# Patient Record
Sex: FEMALE | Race: OTHER | Employment: UNEMPLOYED | URBAN - METROPOLITAN AREA
[De-identification: names, ages, dates, MRNs, and addresses within clinical notes are randomized per-mention and may not be internally consistent; named-entity substitution may affect disease eponyms.]

---

## 2023-12-11 ENCOUNTER — OFFICE VISIT (OUTPATIENT)
Dept: FAMILY MEDICINE CLINIC | Facility: CLINIC | Age: 83
End: 2023-12-11
Payer: COMMERCIAL

## 2023-12-11 VITALS
HEIGHT: 61 IN | SYSTOLIC BLOOD PRESSURE: 120 MMHG | DIASTOLIC BLOOD PRESSURE: 60 MMHG | HEART RATE: 70 BPM | TEMPERATURE: 97 F | WEIGHT: 142 LBS | OXYGEN SATURATION: 98 % | BODY MASS INDEX: 26.81 KG/M2 | RESPIRATION RATE: 16 BRPM

## 2023-12-11 DIAGNOSIS — Z95.820 STATUS POST ANGIOPLASTY WITH STENT: Primary | ICD-10-CM

## 2023-12-11 DIAGNOSIS — I48.91 NEW ONSET ATRIAL FIBRILLATION (HCC): ICD-10-CM

## 2023-12-11 DIAGNOSIS — E78.2 MIXED HYPERLIPIDEMIA: ICD-10-CM

## 2023-12-11 DIAGNOSIS — I21.4 NON-STEMI (NON-ST ELEVATED MYOCARDIAL INFARCTION) (HCC): ICD-10-CM

## 2023-12-11 DIAGNOSIS — I10 ESSENTIAL HYPERTENSION: ICD-10-CM

## 2023-12-11 DIAGNOSIS — I25.2 HISTORY OF MI (MYOCARDIAL INFARCTION): ICD-10-CM

## 2023-12-11 DIAGNOSIS — E04.1 THYROID NODULE: ICD-10-CM

## 2023-12-11 DIAGNOSIS — Z86.39 HISTORY OF NON-INSULIN DEPENDENT TYPE 2 DIABETES MELLITUS: ICD-10-CM

## 2023-12-11 DIAGNOSIS — M15.9 OSTEOARTHRITIS INVOLVING MULTIPLE JOINTS ON BOTH SIDES OF BODY: ICD-10-CM

## 2023-12-11 PROBLEM — E03.8 HYPOTHYROIDISM DUE TO HASHIMOTO'S THYROIDITIS: Status: ACTIVE | Noted: 2017-06-17

## 2023-12-11 PROBLEM — E78.5 HYPERLIPIDEMIA: Status: ACTIVE | Noted: 2017-06-17

## 2023-12-11 PROBLEM — E06.3 HYPOTHYROIDISM DUE TO HASHIMOTO'S THYROIDITIS: Status: ACTIVE | Noted: 2017-06-17

## 2023-12-11 PROCEDURE — 99203 OFFICE O/P NEW LOW 30 MIN: CPT | Performed by: FAMILY MEDICINE

## 2023-12-11 PROCEDURE — 82570 ASSAY OF URINE CREATININE: CPT | Performed by: FAMILY MEDICINE

## 2023-12-11 PROCEDURE — 83036 HEMOGLOBIN GLYCOSYLATED A1C: CPT | Performed by: FAMILY MEDICINE

## 2023-12-11 PROCEDURE — 82043 UR ALBUMIN QUANTITATIVE: CPT | Performed by: FAMILY MEDICINE

## 2023-12-11 RX ORDER — BLOOD-GLUCOSE SENSOR
EACH MISCELLANEOUS
COMMUNITY
Start: 2023-11-08

## 2023-12-11 RX ORDER — HYDROCODONE BITARTRATE AND ACETAMINOPHEN 5; 325 MG/1; MG/1
1 TABLET ORAL EVERY 6 HOURS PRN
Qty: 15 TABLET | Refills: 0 | Status: SHIPPED | OUTPATIENT
Start: 2023-12-11 | End: 2023-12-26

## 2023-12-11 RX ORDER — CLOPIDOGREL BISULFATE 75 MG/1
75 TABLET ORAL DAILY
COMMUNITY
Start: 2022-07-20

## 2023-12-11 RX ORDER — SOTALOL HYDROCHLORIDE 80 MG/1
80 TABLET ORAL DAILY
COMMUNITY
Start: 2021-09-21

## 2023-12-11 RX ORDER — ISOSORBIDE MONONITRATE 30 MG/1
30 TABLET, EXTENDED RELEASE ORAL EVERY 24 HOURS
COMMUNITY

## 2023-12-11 RX ORDER — METFORMIN HYDROCHLORIDE 500 MG/1
1000 TABLET, EXTENDED RELEASE ORAL 2 TIMES DAILY
COMMUNITY
Start: 2023-07-07

## 2023-12-11 RX ORDER — ROSUVASTATIN CALCIUM 40 MG/1
40 TABLET, COATED ORAL DAILY
COMMUNITY
Start: 2023-08-30

## 2023-12-11 RX ORDER — DAPAGLIFLOZIN 5 MG/1
5 TABLET, FILM COATED ORAL DAILY
COMMUNITY

## 2023-12-11 RX ORDER — IRBESARTAN 75 MG/1
75 TABLET ORAL DAILY
COMMUNITY
Start: 2021-10-12

## 2023-12-12 LAB
CREAT UR-SCNC: 90.6 MG/DL
EST. AVERAGE GLUCOSE BLD GHB EST-MCNC: 209 MG/DL (ref 68–126)
HBA1C MFR BLD: 8.9 % (ref ?–5.7)
MICROALBUMIN UR-MCNC: 19.7 MG/DL
MICROALBUMIN/CREAT 24H UR-RTO: 217.4 UG/MG (ref ?–30)

## 2023-12-13 ENCOUNTER — TELEPHONE (OUTPATIENT)
Dept: FAMILY MEDICINE CLINIC | Facility: CLINIC | Age: 83
End: 2023-12-13

## 2023-12-13 NOTE — TELEPHONE ENCOUNTER
----- Message from Bib Whitmore, DO sent at 12/12/2023  5:00 PM CST -----  So can we go up to 25 and see how she does with it, maybe we can gradually adjust her and leave everything else the same.

## 2023-12-28 RX ORDER — DAPAGLIFLOZIN 5 MG/1
5 TABLET, FILM COATED ORAL DAILY
Qty: 90 TABLET | Refills: 2 | Status: SHIPPED | OUTPATIENT
Start: 2023-12-28 | End: 2024-03-27

## 2023-12-28 RX ORDER — CLOPIDOGREL BISULFATE 75 MG/1
75 TABLET ORAL DAILY
Qty: 90 TABLET | Refills: 0 | OUTPATIENT
Start: 2023-12-28

## 2023-12-28 RX ORDER — ISOSORBIDE MONONITRATE 30 MG/1
30 TABLET, EXTENDED RELEASE ORAL EVERY 24 HOURS
Qty: 90 TABLET | Refills: 2 | Status: SHIPPED | OUTPATIENT
Start: 2023-12-28 | End: 2024-03-27

## 2023-12-28 RX ORDER — ROSUVASTATIN CALCIUM 40 MG/1
40 TABLET, COATED ORAL DAILY
Qty: 90 TABLET | Refills: 2 | Status: SHIPPED | OUTPATIENT
Start: 2023-12-28 | End: 2024-03-27

## 2023-12-28 RX ORDER — METFORMIN HYDROCHLORIDE 500 MG/1
1000 TABLET, EXTENDED RELEASE ORAL 2 TIMES DAILY
Qty: 180 TABLET | Refills: 2 | Status: SHIPPED | OUTPATIENT
Start: 2023-12-28 | End: 2024-03-27

## 2023-12-28 RX ORDER — SOTALOL HYDROCHLORIDE 80 MG/1
80 TABLET ORAL DAILY
Qty: 90 TABLET | Refills: 2 | OUTPATIENT
Start: 2023-12-28 | End: 2024-09-23

## 2023-12-28 RX ORDER — IRBESARTAN 75 MG/1
75 TABLET ORAL DAILY
Qty: 90 TABLET | Refills: 2 | OUTPATIENT
Start: 2023-12-28 | End: 2024-09-23

## 2023-12-28 RX ORDER — SOTALOL HYDROCHLORIDE 80 MG/1
80 TABLET ORAL DAILY
Qty: 90 TABLET | Refills: 2 | Status: SHIPPED | OUTPATIENT
Start: 2023-12-28 | End: 2024-03-27

## 2023-12-28 RX ORDER — ROSUVASTATIN CALCIUM 40 MG/1
40 TABLET, COATED ORAL DAILY
Qty: 90 TABLET | Refills: 2 | OUTPATIENT
Start: 2023-12-28 | End: 2024-09-23

## 2023-12-28 RX ORDER — DAPAGLIFLOZIN 5 MG/1
5 TABLET, FILM COATED ORAL DAILY
Qty: 90 TABLET | Refills: 2 | OUTPATIENT
Start: 2023-12-28 | End: 2024-09-23

## 2023-12-28 RX ORDER — IRBESARTAN 75 MG/1
75 TABLET ORAL DAILY
Qty: 90 TABLET | Refills: 2 | Status: SHIPPED | OUTPATIENT
Start: 2023-12-28 | End: 2024-03-27

## 2023-12-28 NOTE — TELEPHONE ENCOUNTER
DAUGHTER STOPPED BY AND ADV THAT PT NEEDS SOME REFILLS OF    rosuvastatin 40 MG Oral Tab     clopidogrel 75 MG Oral Tab     irbesartan 75 MG Oral Tab     sotalol 80 MG Oral Tab     FARXIGA 5 MG Oral Tab     ** LOOKING FOR 90 DAY REFILL **    JAYE AVILEZ       THANK YOU

## 2024-01-02 RX ORDER — CLOPIDOGREL BISULFATE 75 MG/1
75 TABLET ORAL DAILY
Refills: 0 | OUTPATIENT
Start: 2024-01-02

## 2024-01-02 RX ORDER — ROSUVASTATIN CALCIUM 40 MG/1
40 TABLET, COATED ORAL DAILY
Qty: 90 TABLET | Refills: 2 | OUTPATIENT
Start: 2024-01-02 | End: 2024-04-01

## 2024-01-02 RX ORDER — IRBESARTAN 75 MG/1
75 TABLET ORAL DAILY
Qty: 90 TABLET | Refills: 2 | OUTPATIENT
Start: 2024-01-02 | End: 2024-04-01

## 2024-01-02 RX ORDER — DAPAGLIFLOZIN 5 MG/1
5 TABLET, FILM COATED ORAL DAILY
Qty: 90 TABLET | Refills: 2 | OUTPATIENT
Start: 2024-01-02 | End: 2024-04-01

## 2024-01-02 RX ORDER — SOTALOL HYDROCHLORIDE 80 MG/1
80 TABLET ORAL DAILY
Qty: 90 TABLET | Refills: 2 | OUTPATIENT
Start: 2024-01-02 | End: 2024-04-01

## 2024-02-28 DIAGNOSIS — Z95.820 STATUS POST ANGIOPLASTY WITH STENT: ICD-10-CM

## 2024-02-28 DIAGNOSIS — I25.2 HISTORY OF MI (MYOCARDIAL INFARCTION): Primary | ICD-10-CM

## 2024-02-28 DIAGNOSIS — I21.4 NON-STEMI (NON-ST ELEVATED MYOCARDIAL INFARCTION) (HCC): ICD-10-CM

## 2024-03-13 ENCOUNTER — MED REC SCAN ONLY (OUTPATIENT)
Dept: FAMILY MEDICINE CLINIC | Facility: CLINIC | Age: 84
End: 2024-03-13

## 2024-03-14 ENCOUNTER — TELEPHONE (OUTPATIENT)
Dept: FAMILY MEDICINE CLINIC | Facility: CLINIC | Age: 84
End: 2024-03-14

## 2024-03-14 DIAGNOSIS — E11.9 DIABETES MELLITUS TYPE 2, NONINSULIN DEPENDENT (HCC): Primary | ICD-10-CM

## 2024-03-14 DIAGNOSIS — D50.8 OTHER IRON DEFICIENCY ANEMIA: ICD-10-CM

## 2024-03-14 DIAGNOSIS — E78.2 MIXED HYPERLIPIDEMIA: ICD-10-CM

## 2024-03-14 DIAGNOSIS — E04.1 THYROID NODULE: ICD-10-CM

## 2024-03-14 DIAGNOSIS — Z86.39 HISTORY OF NON-INSULIN DEPENDENT TYPE 2 DIABETES MELLITUS: ICD-10-CM

## 2024-03-14 NOTE — TELEPHONE ENCOUNTER
Future Appointments   Date Time Provider Department Center   3/16/2024 11:00 AM [facility] NURSE LASHONDA Birch       Need lab orders please. Thank you

## 2024-03-18 ENCOUNTER — NURSE ONLY (OUTPATIENT)
Dept: FAMILY MEDICINE CLINIC | Facility: CLINIC | Age: 84
End: 2024-03-18
Payer: COMMERCIAL

## 2024-03-18 DIAGNOSIS — D50.8 OTHER IRON DEFICIENCY ANEMIA: ICD-10-CM

## 2024-03-18 DIAGNOSIS — E78.2 MIXED HYPERLIPIDEMIA: ICD-10-CM

## 2024-03-18 DIAGNOSIS — E11.9 DIABETES MELLITUS TYPE 2, NONINSULIN DEPENDENT (HCC): ICD-10-CM

## 2024-03-18 DIAGNOSIS — E04.1 THYROID NODULE: ICD-10-CM

## 2024-03-18 LAB
ALBUMIN SERPL-MCNC: 4 G/DL (ref 3.4–5)
ALBUMIN/GLOB SERPL: 1.3 {RATIO} (ref 1–2)
ALP LIVER SERPL-CCNC: 99 U/L
ALT SERPL-CCNC: 133 U/L
ANION GAP SERPL CALC-SCNC: 4 MMOL/L (ref 0–18)
AST SERPL-CCNC: 107 U/L (ref 15–37)
BASOPHILS # BLD AUTO: 0 X10(3) UL (ref 0–0.2)
BASOPHILS NFR BLD AUTO: 0 %
BILIRUB SERPL-MCNC: 0.6 MG/DL (ref 0.1–2)
BUN BLD-MCNC: 27 MG/DL (ref 9–23)
CALCIUM BLD-MCNC: 10 MG/DL (ref 8.5–10.1)
CHLORIDE SERPL-SCNC: 115 MMOL/L (ref 98–112)
CHOLEST SERPL-MCNC: 99 MG/DL (ref ?–200)
CO2 SERPL-SCNC: 25 MMOL/L (ref 21–32)
CREAT BLD-MCNC: 1.81 MG/DL
EGFRCR SERPLBLD CKD-EPI 2021: 27 ML/MIN/1.73M2 (ref 60–?)
EOSINOPHIL # BLD AUTO: 0.08 X10(3) UL (ref 0–0.7)
EOSINOPHIL NFR BLD AUTO: 1.5 %
ERYTHROCYTE [DISTWIDTH] IN BLOOD BY AUTOMATED COUNT: 12.3 %
EST. AVERAGE GLUCOSE BLD GHB EST-MCNC: 275 MG/DL (ref 68–126)
GLOBULIN PLAS-MCNC: 3.1 G/DL (ref 2.8–4.4)
GLUCOSE BLD-MCNC: 134 MG/DL (ref 70–99)
HBA1C MFR BLD: 11.2 % (ref ?–5.7)
HCT VFR BLD AUTO: 34.4 %
HDLC SERPL-MCNC: 53 MG/DL (ref 40–59)
HGB BLD-MCNC: 11.5 G/DL
IMM GRANULOCYTES # BLD AUTO: 0.01 X10(3) UL (ref 0–1)
IMM GRANULOCYTES NFR BLD: 0.2 %
LDLC SERPL CALC-MCNC: 32 MG/DL (ref ?–100)
LYMPHOCYTES # BLD AUTO: 2.71 X10(3) UL (ref 1–4)
LYMPHOCYTES NFR BLD AUTO: 50.7 %
MCH RBC QN AUTO: 29.6 PG (ref 26–34)
MCHC RBC AUTO-ENTMCNC: 33.4 G/DL (ref 31–37)
MCV RBC AUTO: 88.7 FL
MONOCYTES # BLD AUTO: 0.56 X10(3) UL (ref 0.1–1)
MONOCYTES NFR BLD AUTO: 10.5 %
NEUTROPHILS # BLD AUTO: 1.99 X10 (3) UL (ref 1.5–7.7)
NEUTROPHILS # BLD AUTO: 1.99 X10(3) UL (ref 1.5–7.7)
NEUTROPHILS NFR BLD AUTO: 37.1 %
NONHDLC SERPL-MCNC: 46 MG/DL (ref ?–130)
OSMOLALITY SERPL CALC.SUM OF ELEC: 305 MOSM/KG (ref 275–295)
PLATELET # BLD AUTO: 179 10(3)UL (ref 150–450)
POTASSIUM SERPL-SCNC: 5.1 MMOL/L (ref 3.5–5.1)
PROT SERPL-MCNC: 7.1 G/DL (ref 6.4–8.2)
RBC # BLD AUTO: 3.88 X10(6)UL
SODIUM SERPL-SCNC: 144 MMOL/L (ref 136–145)
T4 FREE SERPL-MCNC: 1.1 NG/DL (ref 0.8–1.7)
THYROGLOB SERPL-MCNC: 57 U/ML (ref ?–60)
THYROPEROXIDASE AB SERPL-ACNC: 780 U/ML (ref ?–60)
TRIGL SERPL-MCNC: 60 MG/DL (ref 30–149)
TSI SER-ACNC: 1.63 MIU/ML (ref 0.36–3.74)
VLDLC SERPL CALC-MCNC: 8 MG/DL (ref 0–30)
WBC # BLD AUTO: 5.4 X10(3) UL (ref 4–11)

## 2024-03-18 PROCEDURE — 83036 HEMOGLOBIN GLYCOSYLATED A1C: CPT | Performed by: FAMILY MEDICINE

## 2024-03-18 PROCEDURE — 80050 GENERAL HEALTH PANEL: CPT | Performed by: FAMILY MEDICINE

## 2024-03-18 PROCEDURE — 80061 LIPID PANEL: CPT | Performed by: FAMILY MEDICINE

## 2024-03-18 PROCEDURE — 84439 ASSAY OF FREE THYROXINE: CPT | Performed by: FAMILY MEDICINE

## 2024-03-18 NOTE — PROGRESS NOTES
Here for NV blood draw    1 lavender, 1 mint, 1 gold tube drawn from left AC with butterfly needle    Patient tolerated well and left office in stable condition

## 2024-03-19 ENCOUNTER — TELEPHONE (OUTPATIENT)
Dept: FAMILY MEDICINE CLINIC | Facility: CLINIC | Age: 84
End: 2024-03-19

## 2024-03-19 DIAGNOSIS — R79.89 ELEVATED LFTS: Primary | ICD-10-CM

## 2024-03-19 RX ORDER — BLOOD-GLUCOSE SENSOR
EACH MISCELLANEOUS
Qty: 6 EACH | Refills: 2 | Status: SHIPPED | OUTPATIENT
Start: 2024-03-19

## 2024-03-19 NOTE — TELEPHONE ENCOUNTER
RN faxed results to Cardiologist- requested copy of last ECHO to be sent to PCP.    Updated Dr. Eriberto Roldan under Care Team

## 2024-03-19 NOTE — TELEPHONE ENCOUNTER
----- Message from Bib Whitmore DO sent at 3/19/2024 10:19 AM CDT -----  Notified daughter of results, needs tighter glucose control also better hydration, renal and Liver function may be a perfusion issue. Not sure when last ECHO was and what the EF was. Please forward to Dr. Eriberto Roldan, cardiologist. Lets stop the metformin, cut the rosuvastatin in half increase her insulin to 25 units and adjust upward , recheck CMP in 2 weeks, also discussed hydration

## 2024-04-17 ENCOUNTER — TELEPHONE (OUTPATIENT)
Dept: FAMILY MEDICINE CLINIC | Facility: CLINIC | Age: 84
End: 2024-04-17

## 2024-04-17 DIAGNOSIS — R26.89 LOSS OF BALANCE: ICD-10-CM

## 2024-04-17 DIAGNOSIS — R26.9 ABNORMAL GAIT: ICD-10-CM

## 2024-04-17 DIAGNOSIS — I21.4 NON-STEMI (NON-ST ELEVATED MYOCARDIAL INFARCTION) (HCC): Primary | ICD-10-CM

## 2024-04-17 DIAGNOSIS — R29.898 WEAKNESS OF LEFT LOWER EXTREMITY: ICD-10-CM

## 2024-04-18 RX ORDER — IRBESARTAN 75 MG/1
75 TABLET ORAL DAILY
COMMUNITY
Start: 2024-04-05

## 2024-04-18 RX ORDER — SOTALOL HYDROCHLORIDE 80 MG/1
80 TABLET ORAL DAILY
COMMUNITY
Start: 2024-04-05

## 2024-04-18 RX ORDER — ROSUVASTATIN CALCIUM 40 MG/1
40 TABLET, COATED ORAL DAILY
COMMUNITY
Start: 2024-04-05

## 2024-04-18 RX ORDER — DAPAGLIFLOZIN 5 MG/1
5 TABLET, FILM COATED ORAL DAILY
COMMUNITY
Start: 2024-04-15

## 2024-04-18 NOTE — TELEPHONE ENCOUNTER
Per discussion with Dr Whitmore, fax order and letter to [facility] in [place]    Order and letter faxed

## 2024-04-18 NOTE — TELEPHONE ENCOUNTER
Received notification [facility] [place] or be faxed to [phone]  There is a form to be completed and faxed with insurance, demographics and OV note  Form has been requested, waiting on fax    Can call [facility] at [phone] with any questions

## 2024-04-19 ENCOUNTER — LAB ENCOUNTER (OUTPATIENT)
Dept: LAB | Age: 84
End: 2024-04-19
Attending: FAMILY MEDICINE
Payer: COMMERCIAL

## 2024-04-19 DIAGNOSIS — R79.89 ELEVATED LFTS: ICD-10-CM

## 2024-04-19 LAB
ALBUMIN SERPL-MCNC: 3.5 G/DL (ref 3.4–5)
ALBUMIN/GLOB SERPL: 0.9 {RATIO} (ref 1–2)
ALP LIVER SERPL-CCNC: 82 U/L
ALT SERPL-CCNC: 61 U/L
ANION GAP SERPL CALC-SCNC: 6 MMOL/L (ref 0–18)
AST SERPL-CCNC: 55 U/L (ref 15–37)
BILIRUB SERPL-MCNC: 0.8 MG/DL (ref 0.1–2)
BUN BLD-MCNC: 25 MG/DL (ref 9–23)
CALCIUM BLD-MCNC: 9.4 MG/DL (ref 8.5–10.1)
CHLORIDE SERPL-SCNC: 108 MMOL/L (ref 98–112)
CO2 SERPL-SCNC: 25 MMOL/L (ref 21–32)
CREAT BLD-MCNC: 1.25 MG/DL
EGFRCR SERPLBLD CKD-EPI 2021: 42 ML/MIN/1.73M2 (ref 60–?)
FASTING STATUS PATIENT QL REPORTED: NO
GLOBULIN PLAS-MCNC: 3.9 G/DL (ref 2.8–4.4)
GLUCOSE BLD-MCNC: 234 MG/DL (ref 70–99)
OSMOLALITY SERPL CALC.SUM OF ELEC: 300 MOSM/KG (ref 275–295)
POTASSIUM SERPL-SCNC: 5.1 MMOL/L (ref 3.5–5.1)
PROT SERPL-MCNC: 7.4 G/DL (ref 6.4–8.2)
SODIUM SERPL-SCNC: 139 MMOL/L (ref 136–145)

## 2024-04-19 PROCEDURE — 80053 COMPREHEN METABOLIC PANEL: CPT | Performed by: FAMILY MEDICINE

## 2024-04-20 ENCOUNTER — TELEPHONE (OUTPATIENT)
Dept: FAMILY MEDICINE CLINIC | Facility: CLINIC | Age: 84
End: 2024-04-20

## 2024-04-20 DIAGNOSIS — N18.32 CKD STAGE 3B, GFR 30-44 ML/MIN (HCC): ICD-10-CM

## 2024-04-20 DIAGNOSIS — E11.9 DIABETES MELLITUS TYPE 2, NONINSULIN DEPENDENT (HCC): Primary | ICD-10-CM

## 2024-04-20 NOTE — PROGRESS NOTES
Notified daughter  of latest lab results, which showed considerable improvement in her renal function after discontinuation of Metformin, and reduction of the rosuvastatin.  Lets keep everything the same as it is currently work on glucose control a bit more and she should be fine we can recheck it in 3-4 months

## 2024-04-20 NOTE — TELEPHONE ENCOUNTER
Pt's dtr requesting lab results from 03/18/24 and 04/19/24    Printed and given to pt's dtr    Dr. Whitmore discussed with pt's dtr regarding lab results 04/19/24    Recall placed - recheck labs in 3-4 months

## 2024-05-30 ENCOUNTER — TELEPHONE (OUTPATIENT)
Dept: FAMILY MEDICINE CLINIC | Facility: CLINIC | Age: 84
End: 2024-05-30

## 2024-05-30 DIAGNOSIS — I21.4 NSTEMI (NON-ST ELEVATION MYOCARDIAL INFARCTION) (HCC): Primary | ICD-10-CM

## 2024-05-30 DIAGNOSIS — R26.9 ABNORMAL GAIT: ICD-10-CM

## 2024-05-30 DIAGNOSIS — R26.89 LOSS OF BALANCE: ICD-10-CM

## 2024-05-30 NOTE — TELEPHONE ENCOUNTER
Daughter is asking for new referral to be placed for wheelchair    Daughter provided information on [employer]- but they do not provide DME products     Order placed for [employer]- as they do wheelchairs- will fax order with insurance card

## 2024-06-18 ENCOUNTER — MED REC SCAN ONLY (OUTPATIENT)
Dept: FAMILY MEDICINE CLINIC | Facility: CLINIC | Age: 84
End: 2024-06-18

## 2024-07-19 ENCOUNTER — TELEPHONE (OUTPATIENT)
Dept: FAMILY MEDICINE CLINIC | Facility: CLINIC | Age: 84
End: 2024-07-19

## 2024-07-19 NOTE — TELEPHONE ENCOUNTER
CALL FROM [employer].  BRAD FROM [employer] SAYS THEY NEVER RECEIVED THE SIGNED ORDER FOR WHEELCHAIR.  BRAD WILL BE NEEDING OFFICE NOTES/H&P, REASON FOR THIS WHEELCHAIR AND THE RX.  BRAD VERIFIED OUR FAX NUMBER AND WILL BE REFAXING ORDER.  HER DIRECT NUMBER [phone]  FAX#[phone]

## 2024-07-22 RX ORDER — BLOOD-GLUCOSE SENSOR
EACH MISCELLANEOUS
Qty: 12 EACH | Refills: 1 | Status: SHIPPED | OUTPATIENT
Start: 2024-07-22

## 2024-07-24 ENCOUNTER — TELEPHONE (OUTPATIENT)
Dept: FAMILY MEDICINE CLINIC | Facility: CLINIC | Age: 84
End: 2024-07-24

## 2024-07-24 PROBLEM — Z87.39 HISTORY OF NECROTIZING FASCIITIS: Status: ACTIVE | Noted: 2024-07-24

## 2024-07-24 NOTE — TELEPHONE ENCOUNTER
Letter mailed to patient reminding her she is due for labs per patient reminder.    Lab Frequency Next Occurrence   Comp Metabolic Panel (14) [E] Once 08/20/2024   Hemoglobin A1C [E] Once 08/20/2024     Miya Calderón RN  P [facility] Clinical Staff  recheck labs in 3-4 months

## 2024-07-25 ENCOUNTER — TELEPHONE (OUTPATIENT)
Dept: FAMILY MEDICINE CLINIC | Facility: CLINIC | Age: 84
End: 2024-07-25

## 2024-08-01 ENCOUNTER — MED REC SCAN ONLY (OUTPATIENT)
Dept: FAMILY MEDICINE CLINIC | Facility: CLINIC | Age: 84
End: 2024-08-01

## 2024-12-06 NOTE — TELEPHONE ENCOUNTER
Last OV:12/11/2023  Last refill:07/22/2024 12 each, 1 refill   Last labs 12/11/2023 A1c     Medication pended, please sign if appropriate

## 2024-12-06 NOTE — TELEPHONE ENCOUNTER
Patient's daughter calling to request refill of Continuous Glucose Sensor (FREESTYLE IND[place] 3 SENSOR) Does not apply Misc   Requesting 3 month supply  Pharmacy [id number] - [place], [place] - [address] [phone], [phone] [[id number]]

## 2024-12-07 RX ORDER — ACYCLOVIR 800 MG/1
TABLET ORAL
Qty: 6 EACH | Refills: 1 | Status: SHIPPED | OUTPATIENT
Start: 2024-12-07

## 2025-02-20 RX ORDER — ACYCLOVIR 800 MG/1
TABLET ORAL
Qty: 6 EACH | Refills: 1 | Status: SHIPPED | OUTPATIENT
Start: 2025-02-20

## 2025-02-20 NOTE — TELEPHONE ENCOUNTER
Pt failed refill protocol for the following reasons:  Requested Renewals     Continuous Glucose Sensor (FREESTYLE IND[place] 3 SENSOR) Does not apply Misc         Sig: INSERT ONE DEVICE INTO THE SKIN EVERY 14 DAYS    Disp: 6 each    Refills: 1    Start: 2/20/2025    Class: Normal    Non-formulary    Last ordered: 2 months ago (12/7/2024) by Yariel Ngo, DO    Diabetic Supplies Protocol Cuwszo3702/20/2025 07:50 AM   Protocol Details In person appointment or virtual visit in the past 12 mos or appointment in next 3 mos    Medication is active on med list      To be filled at: [id number] - [place], [place] - [address] [phone], [phone]            Last refill: 12/7/24  Last appt: 12/11/23  Next appt: No future appointments.      Forward to Dr. Ngo, please advise on refills. Thank you.

## 2025-02-20 NOTE — TELEPHONE ENCOUNTER
Patient's dtr requesting refill for CGM    LOV 12/11/23  Last labs 03/18/24  Last refill on 12/07/24, for #6 each, with 1 refills  Continuous Glucose Sensor (FREESTYLE IND[place] 3 SENSOR) Does not apply Misc     No future appointments.    Order(s) pending, please review. Thank you.

## 2025-04-25 ENCOUNTER — MED REC SCAN ONLY (OUTPATIENT)
Dept: FAMILY MEDICINE CLINIC | Facility: CLINIC | Age: 85
End: 2025-04-25

## 2025-05-02 NOTE — TELEPHONE ENCOUNTER
Cholesterol Medication Protocol [id number]/02/2025 12:42 PM   Protocol Details ALT < 80    ALT resulted within past year    Lipid panel within past 12 months    In person appointment or virtual visit in the past 12 mos or appointment in next 3 mos    Medication is active on med list          Last refill:   rosuvastatin 40 MG Oral Tab -- -- 4/5/2024     Last Visit: 12/11/23    Next Visit: No future appointments.    Labs 3/18/24      Forward to Dr. Ngo please advise on refills. Thanks.

## 2025-05-03 RX ORDER — ROSUVASTATIN CALCIUM 40 MG/1
40 TABLET, COATED ORAL DAILY
Qty: 90 TABLET | Refills: 1 | Status: SHIPPED | OUTPATIENT
Start: 2025-05-03

## 2025-06-02 ENCOUNTER — TELEPHONE (OUTPATIENT)
Dept: FAMILY MEDICINE CLINIC | Facility: CLINIC | Age: 85
End: 2025-06-02

## 2025-07-10 RX ORDER — DAPAGLIFLOZIN 5 MG/1
5 TABLET, FILM COATED ORAL DAILY
Qty: 90 TABLET | Refills: 0 | Status: SHIPPED | OUTPATIENT
Start: 2025-07-10

## 2025-07-10 RX ORDER — IRBESARTAN 75 MG/1
75 TABLET ORAL DAILY
Qty: 90 TABLET | Refills: 0 | Status: SHIPPED | OUTPATIENT
Start: 2025-07-10

## 2025-07-10 RX ORDER — SOTALOL HYDROCHLORIDE 80 MG/1
80 TABLET ORAL DAILY
Qty: 90 TABLET | Refills: 0 | Status: SHIPPED | OUTPATIENT
Start: 2025-07-10

## 2025-07-10 NOTE — TELEPHONE ENCOUNTER
Please review;     Medications listed as patient reported.      Please see message below for upcoming appointment.    Future Appointments   Date Time Provider Department Center   7/12/2025  8:30 AM Yariel Ngo DO [facility] [place]

## 2025-07-12 ENCOUNTER — OFFICE VISIT (OUTPATIENT)
Dept: FAMILY MEDICINE CLINIC | Facility: CLINIC | Age: 85
End: 2025-07-12
Payer: COMMERCIAL

## 2025-07-12 VITALS
BODY MASS INDEX: 27.85 KG/M2 | TEMPERATURE: 97 F | HEART RATE: 56 BPM | WEIGHT: 140 LBS | OXYGEN SATURATION: 98 % | RESPIRATION RATE: 16 BRPM | DIASTOLIC BLOOD PRESSURE: 62 MMHG | HEIGHT: 59.5 IN | SYSTOLIC BLOOD PRESSURE: 128 MMHG

## 2025-07-12 DIAGNOSIS — M54.50 CHRONIC BILATERAL LOW BACK PAIN WITHOUT SCIATICA: ICD-10-CM

## 2025-07-12 DIAGNOSIS — E11.9 DIABETES MELLITUS TYPE 2, NONINSULIN DEPENDENT (HCC): ICD-10-CM

## 2025-07-12 DIAGNOSIS — M25.551 HIP PAIN, RIGHT: ICD-10-CM

## 2025-07-12 DIAGNOSIS — D50.8 OTHER IRON DEFICIENCY ANEMIA: ICD-10-CM

## 2025-07-12 DIAGNOSIS — G89.29 CHRONIC BILATERAL LOW BACK PAIN WITHOUT SCIATICA: ICD-10-CM

## 2025-07-12 DIAGNOSIS — Z13.1 SCREENING FOR DIABETES MELLITUS (DM): ICD-10-CM

## 2025-07-12 DIAGNOSIS — I10 ESSENTIAL HYPERTENSION: ICD-10-CM

## 2025-07-12 DIAGNOSIS — Z13.6 SCREENING FOR CARDIOVASCULAR CONDITION: ICD-10-CM

## 2025-07-12 DIAGNOSIS — I21.4 NON-STEMI (NON-ST ELEVATED MYOCARDIAL INFARCTION) (HCC): Primary | ICD-10-CM

## 2025-07-12 DIAGNOSIS — I25.2 HISTORY OF MI (MYOCARDIAL INFARCTION): ICD-10-CM

## 2025-07-12 DIAGNOSIS — E78.2 MIXED HYPERLIPIDEMIA: ICD-10-CM

## 2025-07-12 DIAGNOSIS — E06.3 HYPOTHYROIDISM DUE TO HASHIMOTO'S THYROIDITIS: ICD-10-CM

## 2025-07-12 PROBLEM — D64.9 ABSOLUTE ANEMIA: Status: ACTIVE | Noted: 2025-07-12

## 2025-07-12 LAB
ALBUMIN SERPL-MCNC: 4.3 G/DL (ref 3.2–4.8)
ALBUMIN/GLOB SERPL: 1.4 {RATIO} (ref 1–2)
ALP LIVER SERPL-CCNC: 109 U/L (ref 55–142)
ALT SERPL-CCNC: 45 U/L (ref 10–49)
ANION GAP SERPL CALC-SCNC: 8 MMOL/L (ref 0–18)
AST SERPL-CCNC: 59 U/L (ref ?–34)
BASOPHILS # BLD AUTO: 0 X10(3) UL (ref 0–0.2)
BASOPHILS NFR BLD AUTO: 0 %
BILIRUB SERPL-MCNC: 0.8 MG/DL (ref 0.2–1.1)
BUN BLD-MCNC: 27 MG/DL (ref 9–23)
CALCIUM BLD-MCNC: 9.3 MG/DL (ref 8.7–10.6)
CHLORIDE SERPL-SCNC: 107 MMOL/L (ref 98–112)
CHOLEST SERPL-MCNC: 107 MG/DL (ref ?–200)
CO2 SERPL-SCNC: 26 MMOL/L (ref 21–32)
CREAT BLD-MCNC: 1.39 MG/DL (ref 0.55–1.02)
CREAT UR-SCNC: 65.2 MG/DL
DEPRECATED HBV CORE AB SER IA-ACNC: 38 NG/ML (ref 50–306)
EGFRCR SERPLBLD CKD-EPI 2021: 37 ML/MIN/1.73M2 (ref 60–?)
EOSINOPHIL # BLD AUTO: 0.08 X10(3) UL (ref 0–0.7)
EOSINOPHIL NFR BLD AUTO: 1.6 %
ERYTHROCYTE [DISTWIDTH] IN BLOOD BY AUTOMATED COUNT: 13 %
EST. AVERAGE GLUCOSE BLD GHB EST-MCNC: 252 MG/DL (ref 68–126)
FASTING PATIENT LIPID ANSWER: YES
FASTING STATUS PATIENT QL REPORTED: YES
GLOBULIN PLAS-MCNC: 3 G/DL (ref 2–3.5)
GLUCOSE BLD-MCNC: 103 MG/DL (ref 70–99)
HBA1C MFR BLD: 10.4 % (ref ?–5.7)
HCT VFR BLD AUTO: 36 % (ref 35–48)
HDLC SERPL-MCNC: 52 MG/DL (ref 40–59)
HGB BLD-MCNC: 11.8 G/DL (ref 12–16)
IMM GRANULOCYTES # BLD AUTO: 0.01 X10(3) UL (ref 0–1)
IMM GRANULOCYTES NFR BLD: 0.2 %
LDLC SERPL CALC-MCNC: 42 MG/DL (ref ?–100)
LYMPHOCYTES # BLD AUTO: 2.54 X10(3) UL (ref 1–4)
LYMPHOCYTES NFR BLD AUTO: 50.9 %
MCH RBC QN AUTO: 29.2 PG (ref 26–34)
MCHC RBC AUTO-ENTMCNC: 32.8 G/DL (ref 31–37)
MCV RBC AUTO: 89.1 FL (ref 80–100)
MICROALBUMIN UR-MCNC: 2.8 MG/DL
MICROALBUMIN/CREAT 24H UR-RTO: 42.9 UG/MG (ref ?–30)
MONOCYTES # BLD AUTO: 0.5 X10(3) UL (ref 0.1–1)
MONOCYTES NFR BLD AUTO: 10 %
NEUTROPHILS # BLD AUTO: 1.86 X10 (3) UL (ref 1.5–7.7)
NEUTROPHILS # BLD AUTO: 1.86 X10(3) UL (ref 1.5–7.7)
NEUTROPHILS NFR BLD AUTO: 37.3 %
NONHDLC SERPL-MCNC: 55 MG/DL (ref ?–130)
OSMOLALITY SERPL CALC.SUM OF ELEC: 297 MOSM/KG (ref 275–295)
PLATELET # BLD AUTO: 200 10(3)UL (ref 150–450)
POTASSIUM SERPL-SCNC: 4.8 MMOL/L (ref 3.5–5.1)
PROT SERPL-MCNC: 7.3 G/DL (ref 5.7–8.2)
RBC # BLD AUTO: 4.04 X10(6)UL (ref 3.8–5.3)
SODIUM SERPL-SCNC: 141 MMOL/L (ref 136–145)
TRIGL SERPL-MCNC: 58 MG/DL (ref 30–149)
TSI SER-ACNC: 2.17 UIU/ML (ref 0.55–4.78)
VLDLC SERPL CALC-MCNC: 8 MG/DL (ref 0–30)
WBC # BLD AUTO: 5 X10(3) UL (ref 4–11)

## 2025-07-12 PROCEDURE — 3074F SYST BP LT 130 MM HG: CPT | Performed by: FAMILY MEDICINE

## 2025-07-12 PROCEDURE — 82728 ASSAY OF FERRITIN: CPT | Performed by: FAMILY MEDICINE

## 2025-07-12 PROCEDURE — 99214 OFFICE O/P EST MOD 30 MIN: CPT | Performed by: FAMILY MEDICINE

## 2025-07-12 PROCEDURE — 82043 UR ALBUMIN QUANTITATIVE: CPT | Performed by: FAMILY MEDICINE

## 2025-07-12 PROCEDURE — 83036 HEMOGLOBIN GLYCOSYLATED A1C: CPT | Performed by: FAMILY MEDICINE

## 2025-07-12 PROCEDURE — 82570 ASSAY OF URINE CREATININE: CPT | Performed by: FAMILY MEDICINE

## 2025-07-12 PROCEDURE — 80050 GENERAL HEALTH PANEL: CPT | Performed by: FAMILY MEDICINE

## 2025-07-12 PROCEDURE — 3008F BODY MASS INDEX DOCD: CPT | Performed by: FAMILY MEDICINE

## 2025-07-12 PROCEDURE — 80061 LIPID PANEL: CPT | Performed by: FAMILY MEDICINE

## 2025-07-12 PROCEDURE — 3078F DIAST BP <80 MM HG: CPT | Performed by: FAMILY MEDICINE

## 2025-07-12 NOTE — PROGRESS NOTES
Subjective:   Paul Degroot is a 84 year old female who presents for a Annual Physical Exam (not Medicare, or ABN signed for Medicare non covered service) and scheduled follow up of multiple significant but stable problems.             is here for their MWV physical, has not been hospitalized the past year. no recent surgery, no new RX meds and no new allergies    History/Other:   Fall Risk Assessment: {Tip  Fall Risk Assessment:8307}  *** (Incomplete)  {Tip  Fall risk assessment incomplete. Refresh to ensure screening pulls in; if not, click link above to assess, then refresh:8307}      Cognitive Assessment: {Tip  Cognitive Assessment:8307}  *** (Incomplete)  Tip  Cognitive assessment incomplete. Refresh to ensure screening pulls in; if not,click link above to assess, then refresh:8307}      Functional Ability/Status: {Tip  Functional Status:8307}  *** (Incomplete)  {Tip  Functional status incomplete. Refresh to ensure screening pulls in; if not, click link above to assess, then refresh:8307}      Depression Screening (PHQ):{Tip  Depression Screenin}  PHQ-2/9 not done in last week, please ask questions. Last done  *** {Tip  Refresh link after completin}          Advanced Directives: {Tip  Advance Care Plannin}  She does NOT have a Living Will. [ ]  She does NOT have a Power of  for Health Care. [ ]  Discussed Advance Care Planning with patient (and family/surrogate if present). Standard forms made available to patient in After Visit Summary.    {Tip ResultsPMHPSHFHProbListImagingCardioLabAllergiesImm :8307}  Patient Active Problem List   Diagnosis    Essential hypertension    History of MI (myocardial infarction)    Hyperlipidemia    Hypothyroidism due to Hashimoto's thyroiditis    New onset atrial fibrillation (HCC)    Non-STEMI (non-ST elevated myocardial infarction) (HCC)    Thyroid nodule    History of necrotizing fasciitis    Diabetes mellitus type  2, noninsulin dependent (HCC)     Allergies:  She is allergic to aspirin and penicillins.    Current Medications:  Outpatient Medications Marked as Taking for the 7/12/25 encounter (Office Visit) with Yariel Ngo DO   Medication Sig    irbesartan 75 MG Oral Tab Take 1 tablet (75 mg total) by mouth daily.    FARXIGA 5 MG Oral Tab Take 1 tablet (5 mg total) by mouth daily.    sotalol 80 MG Oral Tab Take 1 tablet (80 mg total) by mouth daily.    rosuvastatin 40 MG Oral Tab Take 1 tablet (40 mg total) by mouth daily. (Patient taking differently: Take 0.5 tablets (20 mg total) by mouth daily.)    metFORMIN  MG Oral Tablet 24 Hr Take 2 tablets (1,000 mg total) by mouth 2 (two) times daily.    Cyanocobalamin (VITAMIN B 12 OR) 1 strip by In Vitro route daily.    clopidogrel 75 MG Oral Tab Take 1 tablet (75 mg total) by mouth daily.       Medical History:  She  has no past medical history on file.  Surgical History:  She  has a past surgical history that includes appendectomy and excis uterine fibroid,abd apprch.   Family History:  Her family history is not on file.  Social History:  She  has no history on file for tobacco use, alcohol use, and drug use.    Tobacco:  *** She currently has tobacco smoking, smokeless, or e-cigarette status listed as never assessed or unknown.    Please update the information in the Tobacco history section to reflect the true status, and refresh this smartlink.    CAGE Alcohol Screen: {Tip  CAGE Alcohol Screen:8307}  Cage screening not needed because reported NO to Alcohol use on social history.    {Tip   Care Team:8307}  Patient Care Team:  Yariel Ngo DO as PCP - General (Family Medicine)  Oscar Jimenez as Consulting Physician (CARDIOLOGY)    Review of Systems  GENERAL: feels well otherwise  SKIN: denies any unusual skin lesions  EYES: denies blurred vision or double vision  HEENT: denies nasal congestion, sinus pain or ST  LUNGS: denies shortness of breath with  exertion  CARDIOVASCULAR: denies chest pain on exertion  GI: denies abdominal pain, denies heartburn  : denies dysuria, vaginal discharge or itching, no complaint of urinary incontinence   MUSCULOSKELETAL: denies back pain  NEURO: denies headaches  PSYCHE: denies depression or anxiety  HEMATOLOGIC: denies hx of anemia  ENDOCRINE: denies thyroid history  ALL/ASTHMA: denies hx of allergy or asthma    Objective:   Physical Exam  General Appearance:  Alert, cooperative, no distress, appears stated age   Head:  Normocephalic, without obvious abnormality, atraumatic   Eyes:  PERRL, conjunctiva/corneas clear, EOM's intact both eyes   Ears:  Normal TM's and external ear canals, both ears   Nose: Nares normal, septum midline,mucosa normal, no drainage or sinus tenderness   Throat: Lips, mucosa, and tongue normal; teeth and gums normal   Neck: Supple, symmetrical, trachea midline, no adenopathy;  thyroid: not enlarged, symmetric, no tenderness/mass/nodules; no carotid bruit or JVD   Back:   Symmetric, no curvature, ROM normal, no CVA tenderness   Lungs:   Clear to auscultation bilaterally, respirations unlabored   Heart:  Regular rate and rhythm, S1 and S2 normal, no murmur, rub, or gallop   Abdomen:   Soft, non-tender, bowel sounds active all four quadrants,  no masses, no organomegaly   Pelvic: Deferred   Extremities: Extremities normal, atraumatic, no cyanosis or edema   Pulses: 2+ and symmetric   Skin: Skin color, texture, turgor normal, no rashes or lesions   Lymph nodes: Cervical, supraclavicular, and axillary nodes normal   Neurologic: Normal       There were no vitals taken for this visit. Estimated body mass index is 26.83 kg/m² as calculated from the following:    Height as of 12/11/23: 5' 1\" (1.549 m).    Weight as of 12/11/23: 142 lb (64.4 kg).    Medicare Hearing Assessment: {Tip (Required for AWV/SWV) Hearing Assessment:8307}  *** (Incomplete)  {Tip  Hearing Screening incomplete. Refresh to ensure screening  pulls in; if not, click link above to assess, then refresh:830    {Tip  Vision Screenin}  {Tip  Vision Screening incomplete. Required for IPPE/first MA Supervisit. Click link above to assess, then refresh. If vision screening not recorded, this link will disappear upon signing note/encounter:8307}    Assessment & Plan:   Paul Degroot is a 84 year old female who presents for a Medicare Assessment.     1. Medicare annual wellness visit, subsequent (Primary)  -     Lipid Panel; Future; Expected date: 2025  -     Comp Metabolic Panel (14); Future; Expected date: 2025  -     Hemoglobin A1C; Future; Expected date: 2025  -     TSH W Reflex To Free T4; Future; Expected date: 2025  -     CBC With Differential With Platelet; Future; Expected date: 2025  -     Microalb/Creat Ratio, Random Urine; Future; Expected date: 2025  -     Lipid Panel  -     Comp Metabolic Panel (14)  -     Hemoglobin A1C  -     TSH W Reflex To Free T4  -     CBC With Differential With Platelet  2. Non-STEMI (non-ST elevated myocardial infarction) (HCC)  -     Lipid Panel; Future; Expected date: 2025  -     Comp Metabolic Panel (14); Future; Expected date: 2025  -     TSH W Reflex To Free T4; Future; Expected date: 2025  -     Lipid Panel  -     Comp Metabolic Panel (14)  -     TSH W Reflex To Free T4  3. New onset atrial fibrillation (HCC)  -     Comp Metabolic Panel (14); Future; Expected date: 2025  -     TSH W Reflex To Free T4; Future; Expected date: 2025  -     Comp Metabolic Panel (14)  -     TSH W Reflex To Free T4  4. Hypothyroidism due to Hashimoto's thyroiditis  -     TSH W Reflex To Free T4; Future; Expected date: 2025  -     TSH W Reflex To Free T4  5. Thyroid nodule  -     TSH W Reflex To Free T4; Future; Expected date: 2025  -     TSH W Reflex To Free T4  6. History of MI (myocardial infarction)  -     Lipid Panel; Future; Expected date:  07/12/2025  -     Lipid Panel  7. Mixed hyperlipidemia  -     Lipid Panel; Future; Expected date: 07/12/2025  -     Lipid Panel  8. History of necrotizing fasciitis  -     CBC With Differential With Platelet; Future; Expected date: 07/12/2025  -     CBC With Differential With Platelet  9. Essential hypertension  -     Comp Metabolic Panel (14); Future; Expected date: 07/12/2025  -     Comp Metabolic Panel (14)  10. Encounter for annual health examination  -     Lipid Panel; Future; Expected date: 07/12/2025  -     Comp Metabolic Panel (14); Future; Expected date: 07/12/2025  -     Hemoglobin A1C; Future; Expected date: 07/12/2025  -     TSH W Reflex To Free T4; Future; Expected date: 07/12/2025  -     CBC With Differential With Platelet; Future; Expected date: 07/12/2025  -     Microalb/Creat Ratio, Random Urine; Future; Expected date: 07/12/2025  -     Lipid Panel  -     Comp Metabolic Panel (14)  -     Hemoglobin A1C  -     TSH W Reflex To Free T4  -     CBC With Differential With Platelet  11. Encounter for Medicare annual wellness exam  -     Lipid Panel; Future; Expected date: 07/12/2025  -     Comp Metabolic Panel (14); Future; Expected date: 07/12/2025  -     Hemoglobin A1C; Future; Expected date: 07/12/2025  -     TSH W Reflex To Free T4; Future; Expected date: 07/12/2025  -     CBC With Differential With Platelet; Future; Expected date: 07/12/2025  -     Microalb/Creat Ratio, Random Urine; Future; Expected date: 07/12/2025  -     Lipid Panel  -     Comp Metabolic Panel (14)  -     Hemoglobin A1C  -     TSH W Reflex To Free T4  -     CBC With Differential With Platelet  12. Screening for diabetes mellitus (DM)  -     Hemoglobin A1C; Future; Expected date: 07/12/2025  -     Hemoglobin A1C  13. Screening for cardiovascular condition  -     Lipid Panel; Future; Expected date: 07/12/2025  -     Lipid Panel  14. Diabetes mellitus type 2, noninsulin dependent (HCC)  -     Vitamin B12; Future; Expected date:  07/12/2025  15. Other iron deficiency anemia  -     Ferritin            The patient indicates understanding of these issues and agrees to the plan.  Reinforced healthy diet, lifestyle, and exercise.    {Tip  Follow Up:8307}  Return in 6 months (on 1/12/2026).     Yariel Ngo DO, 7/12/2025     Supplementary Documentation:   General Health:       Health Maintenance   Topic Date Due    Diabetes Care Foot Exam  Never done    Diabetes Care Dilated Eye Exam  Never done    Annual Physical  Never done    Zoster Vaccines (1 of 2) Never done    Diabetes Care A1C  06/18/2024    COVID-19 Vaccine (1 - 2024-25 season) Never done    Annual Depression Screening  01/01/2025    Fall Risk Screening (Annual)  01/01/2025    Diabetes Care: Microalb/Creat Ratio (Annual)  01/01/2025    Diabetes Care: GFR  04/19/2025    Influenza Vaccine (1) 10/01/2025    Pneumococcal Vaccine: 50+ Years  Completed    Meningococcal B Vaccine  Aged Out

## 2025-07-12 NOTE — PROGRESS NOTES
Paul Degroot is a 84 year old female.  HPI:   Paul, is here for low back pain issues and also needs updated labs, for her DM2 having issues with low BLOOD SUGAR readings I the AM, in spite of taking her insulin at a reduced dose 10mg versus 20 in the AM. She is not good about eating, grazes all day. But last night had some cheese and crackers at 11 pm and this morning BLOOD SUGAR was 60 and symptomatic. Has a HX of CKD4, adjusted meds last visit, is taking Farxiga, stopped metformin due to deteriorating renal function. She also has low back and hip pain, the low back pain is bilateral mostly around the pelvic girdle, but also has hip pain. Cannot ambulate very far without stopping or a cane for assistance due to pain,laying flat makes it better. Pretty much home bound, also noted increase in fatigue. Has not had an echo or stress test since her last stent in 2021. Denies chest pain does state she gets some tightness in her neck.. she is homebound does not drive,    Current Outpatient Medications   Medication Sig Dispense Refill    irbesartan 75 MG Oral Tab Take 1 tablet (75 mg total) by mouth daily. 90 tablet 0    FARXIGA 5 MG Oral Tab Take 1 tablet (5 mg total) by mouth daily. 90 tablet 0    sotalol 80 MG Oral Tab Take 1 tablet (80 mg total) by mouth daily. 90 tablet 0    rosuvastatin 40 MG Oral Tab Take 1 tablet (40 mg total) by mouth daily. (Patient taking differently: Take 0.5 tablets (20 mg total) by mouth daily.) 90 tablet 1    metFORMIN  MG Oral Tablet 24 Hr Take 2 tablets (1,000 mg total) by mouth 2 (two) times daily. 180 tablet 2    Cyanocobalamin (VITAMIN B 12 OR) 1 strip by In Vitro route daily.      clopidogrel 75 MG Oral Tab Take 1 tablet (75 mg total) by mouth daily.      apixaban (ELIQUIS) 5 MG Oral Tab Take 1 tablet (5 mg total) by mouth 2 (two) times daily. (Patient not taking: Reported on 7/12/2025) 180 tablet 0    Continuous Glucose Sensor (FREESTYLE [place] 3 SENSOR)  Does not apply Misc INSERT ONE DEVICE INTO THE SKIN EVERY 14 DAYS 6 each 1      No past medical history on file.   Social History:  Social History     Socioeconomic History    Marital status:         REVIEW OF SYSTEMS:   GENERAL HEALTH: fatigued,   HEENT: symptomatic  NECK: has neck pressure at times  SKIN: denies any unusual skin lesions or rashes  RESPIRATORY:  shortness of breath with exertion  CARDIOVASCULAR: denies chest pain on exertion,  GI: denies abdominal pain and denies heartburn  NEURO: denies headaches, denies paresthesia in her feet  EXT: has some dependent edema  MUSC: bilateral low back and right hip pain  EXAM:   /62 (BP Location: Left arm, Patient Position: Sitting, Cuff Size: adult)   Pulse 56   Temp 97.2 °F (36.2 °C) (Temporal)   Resp 16   Ht 4' 11.5\" (1.511 m)   Wt 140 lb (63.5 kg)   SpO2 98%   BMI 27.80 kg/m²   GENERAL: well developed, well nourished,in no apparent distress  SKIN: no rashes,no suspicious lesions  HEENT: atraumatic, normocephalic,ears and throat are clear  NECK: supple,no adenopathy,no bruits  LUNGS: clear to auscultation  CARDIO: RRR without murmur  GI: good BS's,no masses, HSM or tenderness  EXTREMITIES: no cyanosis, clubbing or edema, has previous surgical changes    ASSESSMENT AND PLAN:     Encounter Diagnoses   Name Primary?    Non-STEMI (non-ST elevated myocardial infarction) (HCC) Yes    Hypothyroidism due to Hashimoto's thyroiditis     History of MI (myocardial infarction)     Mixed hyperlipidemia     Essential hypertension     Screening for diabetes mellitus (DM)     Screening for cardiovascular condition     Diabetes mellitus type 2, noninsulin dependent (HCC)     Other iron deficiency anemia     Chronic bilateral low back pain without sciatica     Hip pain, right        Orders Placed This Encounter   Procedures    Lipid Panel    CMP    Hemoglobin A1C    TSH W Reflex To Free T4    CBC    Microalb/Creat Ratio, Random Urine    Vitamin B12 [E]     Ferritin [E]           Imaging & Consults:  [facility] REFERRAL  XR LUMBAR SPINE (MIN 4 VIEWS) (CPT=72110)  XR HIP + PELVIS MIN 4 VIEWS RIGHT (CPT=73503)     The patient indicates understanding of these issues and agrees to the plan.  The patient is asked to return in after we get results of xray.

## 2025-07-14 DIAGNOSIS — E11.9 DIABETES MELLITUS TYPE 2, INSULIN DEPENDENT (HCC): ICD-10-CM

## 2025-07-14 DIAGNOSIS — Z79.4 DIABETES MELLITUS TYPE 2, INSULIN DEPENDENT (HCC): ICD-10-CM

## 2025-07-14 DIAGNOSIS — E11.65 POORLY CONTROLLED TYPE 2 DIABETES MELLITUS (HCC): ICD-10-CM

## 2025-07-14 DIAGNOSIS — E11.9 DIABETES MELLITUS TYPE 2, NONINSULIN DEPENDENT (HCC): Primary | ICD-10-CM

## 2025-08-11 ENCOUNTER — TELEPHONE (OUTPATIENT)
Dept: FAMILY MEDICINE CLINIC | Facility: CLINIC | Age: 85
End: 2025-08-11

## (undated) NOTE — LETTER
04/17/24          To Whom It May Concern,       RE: Layton Elizabeth, 8/25/1940       Please note that Layton Elizabeth, is  an 82 yo current  patient in my practice. She has a History of STEMI,that has left her with markedly reduced Ejection Fraction, and restricted capacity to ambulate. She also has a history of advanced osteoarthritis, and loss of function of the left lower leg due to necrotizing fasciitis. She is legally blind and has gait and balance issues  as a result. I feel she would benefit from a manual wheelchair, that she could propel herself with in her home, and could be used by her daughter when she is out and about. This would allow her more freedom to get out and be less physically taxing than trying to ambulate. If you have any questions or require any further information feel free to contact me.           Thank You,          Bib Whitmore D.O.        order needs to be faxed to [phone]

## (undated) NOTE — LETTER
Paul Degroot   [address] [place] [address]           Dear Paul Degroot     Our records indicate that you have outstanding lab work and or testing that was ordered for you and has not yet been completed:  Lab Frequency Next Occurrence   Comp Metabolic Panel (14) [E] Once 08/20/2024   Hemoglobin A1C [E] Once 08/20/2024      To provide you with the best possible care, please complete these orders at your earliest convenience. If you have recently completed these orders please disregard this letter.     If you have any questions please call the office at [phone].     Thank you,     [facility]